# Patient Record
Sex: MALE | Race: WHITE | NOT HISPANIC OR LATINO
[De-identification: names, ages, dates, MRNs, and addresses within clinical notes are randomized per-mention and may not be internally consistent; named-entity substitution may affect disease eponyms.]

---

## 2019-02-18 ENCOUNTER — APPOINTMENT (OUTPATIENT)
Dept: CARDIOLOGY | Facility: CLINIC | Age: 54
End: 2019-02-18
Payer: COMMERCIAL

## 2019-02-18 PROCEDURE — 93306 TTE W/DOPPLER COMPLETE: CPT

## 2019-02-22 ENCOUNTER — APPOINTMENT (OUTPATIENT)
Dept: CARDIOLOGY | Facility: CLINIC | Age: 54
End: 2019-02-22
Payer: COMMERCIAL

## 2019-02-22 PROCEDURE — 93015 CV STRESS TEST SUPVJ I&R: CPT

## 2019-03-22 ENCOUNTER — APPOINTMENT (OUTPATIENT)
Dept: CARDIOLOGY | Facility: CLINIC | Age: 54
End: 2019-03-22
Payer: COMMERCIAL

## 2019-03-22 ENCOUNTER — NON-APPOINTMENT (OUTPATIENT)
Age: 54
End: 2019-03-22

## 2019-03-22 VITALS — DIASTOLIC BLOOD PRESSURE: 80 MMHG | SYSTOLIC BLOOD PRESSURE: 140 MMHG

## 2019-03-22 VITALS
WEIGHT: 207 LBS | BODY MASS INDEX: 29.63 KG/M2 | SYSTOLIC BLOOD PRESSURE: 134 MMHG | OXYGEN SATURATION: 97 % | HEART RATE: 69 BPM | DIASTOLIC BLOOD PRESSURE: 82 MMHG | HEIGHT: 70 IN

## 2019-03-22 DIAGNOSIS — F17.200 NICOTINE DEPENDENCE, UNSPECIFIED, UNCOMPLICATED: ICD-10-CM

## 2019-03-22 DIAGNOSIS — Z80.9 FAMILY HISTORY OF MALIGNANT NEOPLASM, UNSPECIFIED: ICD-10-CM

## 2019-03-22 DIAGNOSIS — Z72.0 TOBACCO USE: ICD-10-CM

## 2019-03-22 PROCEDURE — 99244 OFF/OP CNSLTJ NEW/EST MOD 40: CPT | Mod: 25

## 2019-03-22 PROCEDURE — 93000 ELECTROCARDIOGRAM COMPLETE: CPT

## 2019-03-22 NOTE — DISCUSSION/SUMMARY
[___ Month(s)] : [unfilled] month(s) [With Me] : with me [FreeTextEntry1] : \par Dilated aortic root: Minimal; likely related to hypertension; I anticipate serial surveillance with echocardiography and/or CT imaging.\par \par Hypertension: I suspect that Mr. Bender has mild hypertension and that he would benefit from pharmacotherapy.  However, since blood pressure is only mildly elevated and this is my first visit with him I am reluctant to initiate therapy at this time; we discussed lifestyle modification - I also asked him to followup with his primary care team (Zac Patricio and Dr. Shant Pollock) to reassess blood pressure.  If his blood pressure is persistently elevated I would favor initiation of low-dose ACE inhibitor, such as lisinopril 10 mg daily, since he did not tolerate beta blockade in the past.\par \par Tobacco use: Daily tobacco use; patient doesn't seem particularly motivated to quit at this time - should continue this discussion.

## 2019-03-22 NOTE — REVIEW OF SYSTEMS
[Eyeglasses] : currently wearing eyeglasses [Shortness Of Breath] : no shortness of breath [Dyspnea on exertion] : not dyspnea during exertion [Chest  Pressure] : no chest pressure [Chest Pain] : no chest pain [Lower Ext Edema] : no extremity edema [Palpitations] : no palpitations [Heartburn] : heartburn [Negative] : Heme/Lymph

## 2019-03-22 NOTE — HISTORY OF PRESENT ILLNESS
[FreeTextEntry1] : Crow Bender is a 53-year-old man mildly dilated aortic root (4 cm on CT 2/2016), acid reflux, and tobacco use who presents for cardiology consultation at the request of his primary care physician.   Several years ago he was diagnosed with mild hypertension and prescribed carvedilol -- he didn't think he had hypertension at the time and didn't like the way carvedilol made him feel so we discontinued shortly after initiation.  He exercises regularly and consumes a healthy diet -- 7 days of resistance training at a local gym.  There is no history of heart disease in his family.  He has not been experiencing angina, dyspnea, or palpitations.  He tries to avoid sodium.\par \par

## 2019-03-22 NOTE — PHYSICAL EXAM
[Well Groomed] : well groomed [General Appearance - In No Acute Distress] : no acute distress [Eyelids - No Xanthelasma] : the eyelids demonstrated no xanthelasmas [No Oral Pallor] : no oral pallor [FreeTextEntry1] : no JVD [Heart Rate And Rhythm] : heart rate and rhythm were normal [Heart Sounds] : normal S1 and S2 [Respiration, Rhythm And Depth] : normal respiratory rhythm and effort [Bowel Sounds] : normal bowel sounds [Abdomen Soft] : soft [Abdomen Tenderness] : non-tender [Abnormal Walk] : normal gait [Nail Clubbing] : no clubbing of the fingernails [Cyanosis, Localized] : no localized cyanosis [] : no rash [Oriented To Time, Place, And Person] : oriented to person, place, and time [Impaired Insight] : insight and judgment were intact [Affect] : the affect was normal [Mood] : the mood was normal

## 2019-03-22 NOTE — REASON FOR VISIT
[Medication Management] : Medication management [FreeTextEntry1] : mildly dilated aortic root, elevated blood pressure

## 2020-12-15 DIAGNOSIS — Z87.891 PERSONAL HISTORY OF NICOTINE DEPENDENCE: ICD-10-CM

## 2020-12-15 RX ORDER — DEXLANSOPRAZOLE 30 MG/1
30 CAPSULE, DELAYED RELEASE ORAL DAILY
Refills: 0 | Status: DISCONTINUED | COMMUNITY
End: 2020-12-15

## 2020-12-16 ENCOUNTER — APPOINTMENT (OUTPATIENT)
Dept: CARDIOLOGY | Facility: CLINIC | Age: 55
End: 2020-12-16
Payer: COMMERCIAL

## 2020-12-16 ENCOUNTER — NON-APPOINTMENT (OUTPATIENT)
Age: 55
End: 2020-12-16

## 2020-12-16 VITALS
OXYGEN SATURATION: 98 % | HEIGHT: 70 IN | SYSTOLIC BLOOD PRESSURE: 154 MMHG | HEART RATE: 58 BPM | WEIGHT: 214 LBS | BODY MASS INDEX: 30.64 KG/M2 | DIASTOLIC BLOOD PRESSURE: 87 MMHG

## 2020-12-16 PROCEDURE — 99072 ADDL SUPL MATRL&STAF TM PHE: CPT

## 2020-12-16 PROCEDURE — 93000 ELECTROCARDIOGRAM COMPLETE: CPT

## 2020-12-16 PROCEDURE — 99244 OFF/OP CNSLTJ NEW/EST MOD 40: CPT | Mod: 25

## 2021-01-25 ENCOUNTER — NON-APPOINTMENT (OUTPATIENT)
Age: 56
End: 2021-01-25

## 2021-01-27 ENCOUNTER — APPOINTMENT (OUTPATIENT)
Dept: CARDIOLOGY | Facility: CLINIC | Age: 56
End: 2021-01-27
Payer: COMMERCIAL

## 2021-01-27 VITALS
DIASTOLIC BLOOD PRESSURE: 90 MMHG | SYSTOLIC BLOOD PRESSURE: 170 MMHG | HEART RATE: 57 BPM | HEIGHT: 70 IN | WEIGHT: 218 LBS | BODY MASS INDEX: 31.21 KG/M2 | OXYGEN SATURATION: 97 %

## 2021-01-27 VITALS — SYSTOLIC BLOOD PRESSURE: 144 MMHG | DIASTOLIC BLOOD PRESSURE: 86 MMHG

## 2021-01-27 PROCEDURE — 99214 OFFICE O/P EST MOD 30 MIN: CPT

## 2021-01-27 PROCEDURE — 99072 ADDL SUPL MATRL&STAF TM PHE: CPT

## 2021-01-27 PROCEDURE — 93000 ELECTROCARDIOGRAM COMPLETE: CPT

## 2021-02-09 ENCOUNTER — APPOINTMENT (OUTPATIENT)
Dept: CARDIOLOGY | Facility: CLINIC | Age: 56
End: 2021-02-09
Payer: COMMERCIAL

## 2021-02-09 PROCEDURE — 99072 ADDL SUPL MATRL&STAF TM PHE: CPT

## 2021-02-09 PROCEDURE — 93306 TTE W/DOPPLER COMPLETE: CPT

## 2021-02-11 ENCOUNTER — NON-APPOINTMENT (OUTPATIENT)
Age: 56
End: 2021-02-11

## 2021-09-07 ENCOUNTER — APPOINTMENT (OUTPATIENT)
Dept: SURGERY | Facility: CLINIC | Age: 56
End: 2021-09-07

## 2021-10-01 ENCOUNTER — APPOINTMENT (OUTPATIENT)
Dept: SURGERY | Facility: CLINIC | Age: 56
End: 2021-10-01
Payer: COMMERCIAL

## 2021-10-01 DIAGNOSIS — K42.9 UMBILICAL HERNIA W/OUT OBSTRUCTION OR GANGRENE: ICD-10-CM

## 2021-10-01 PROCEDURE — 99203 OFFICE O/P NEW LOW 30 MIN: CPT

## 2021-10-01 NOTE — HISTORY OF PRESENT ILLNESS
[de-identified] : Referred by his brother (forme patient of [facility]) for evaluation of small bulge in umbilicus, hernia.

## 2021-10-01 NOTE — ASSESSMENT
[FreeTextEntry1] : 56-year-old otherwise healthy gentleman presenting for evaluation of a small umbilical hernia.  The patient does not describe any adverse symptoms.  He does note a small nodule at the superior margin of his umbilicus.  This patient exercises regularly and has a relatively physical job working for [employer].  As he is entering the busy season prior [facility] he is hoping repair pair could be deferred until after the [place] season.\par \par Examination reveals a very small subcentimeter umbilical hernia with what appears to be a small knuckle of properitoneal incarcerated fat.  There is no erythema, induration or fluctuance.  This is not tender to palpation.  And once again this patient has no obstructive symptoms.\par \par Surgical repair has been recommended as this can enlarge over time and potentially become strangulated.  Given the small nature of this defect an open repair should be suitable with minimal scarring.\par \par The patient will return to schedule electively at his convenience.\par \par Signs and symptoms of incarceration/strangulation/obstruction have been reviewed with the patient.  Should such symptoms present, urgent medical attention should be sought.  Contact my office immediately and or head to your nearest emergency room for evaluation and treatment.  This may require immediate surgical repair.

## 2021-10-01 NOTE — PHYSICAL EXAM
[Normal Breath Sounds] : Normal breath sounds [Normal Heart Sounds] : normal heart sounds [Normal Rate and Rhythm] : normal rate and rhythm [No Rash or Lesion] : No rash or lesion [Alert] : alert [Oriented to Person] : oriented to person [Oriented to Place] : oriented to place [Oriented to Time] : oriented to time [Calm] : calm [de-identified] : Very fit and muscular gentleman in no distress [de-identified] : MIRZA SIERRA EOMI [de-identified] : Soft, nontender nondistended, positive bowel sounds in all four quads.   No rebound or guarding.  Very small fat containing umbilical defect.\par  [de-identified] : Ambulating without difficulty or  assistance

## 2022-01-02 ENCOUNTER — TRANSCRIPTION ENCOUNTER (OUTPATIENT)
Age: 57
End: 2022-01-02

## 2022-01-03 ENCOUNTER — EMERGENCY (EMERGENCY)
Facility: HOSPITAL | Age: 57
LOS: 1 days | Discharge: HOME | End: 2022-01-03
Attending: EMERGENCY MEDICINE | Admitting: EMERGENCY MEDICINE
Payer: COMMERCIAL

## 2022-01-03 VITALS
OXYGEN SATURATION: 99 % | TEMPERATURE: 98 F | RESPIRATION RATE: 16 BRPM | DIASTOLIC BLOOD PRESSURE: 72 MMHG | HEART RATE: 81 BPM | SYSTOLIC BLOOD PRESSURE: 163 MMHG

## 2022-01-03 VITALS
HEART RATE: 61 BPM | TEMPERATURE: 98 F | SYSTOLIC BLOOD PRESSURE: 210 MMHG | HEIGHT: 70 IN | WEIGHT: 214.95 LBS | OXYGEN SATURATION: 98 % | RESPIRATION RATE: 18 BRPM | DIASTOLIC BLOOD PRESSURE: 105 MMHG

## 2022-01-03 PROCEDURE — 96372 THER/PROPH/DIAG INJ SC/IM: CPT

## 2022-01-03 PROCEDURE — 99283 EMERGENCY DEPT VISIT LOW MDM: CPT | Mod: 25

## 2022-01-03 PROCEDURE — 87205 SMEAR GRAM STAIN: CPT

## 2022-01-03 PROCEDURE — 90471 IMMUNIZATION ADMIN: CPT

## 2022-01-03 PROCEDURE — 90715 TDAP VACCINE 7 YRS/> IM: CPT

## 2022-01-03 PROCEDURE — 87077 CULTURE AEROBIC IDENTIFY: CPT

## 2022-01-03 PROCEDURE — 10060 I&D ABSCESS SIMPLE/SINGLE: CPT

## 2022-01-03 PROCEDURE — 99284 EMERGENCY DEPT VISIT MOD MDM: CPT

## 2022-01-03 PROCEDURE — 87075 CULTR BACTERIA EXCEPT BLOOD: CPT

## 2022-01-03 PROCEDURE — 87070 CULTURE OTHR SPECIMN AEROBIC: CPT

## 2022-01-03 RX ORDER — TETANUS TOXOID, REDUCED DIPHTHERIA TOXOID AND ACELLULAR PERTUSSIS VACCINE, ADSORBED 5; 2.5; 8; 8; 2.5 [IU]/.5ML; [IU]/.5ML; UG/.5ML; UG/.5ML; UG/.5ML
0.5 SUSPENSION INTRAMUSCULAR ONCE
Refills: 0 | Status: COMPLETED | OUTPATIENT
Start: 2022-01-03 | End: 2022-01-03

## 2022-01-03 RX ADMIN — TETANUS TOXOID, REDUCED DIPHTHERIA TOXOID AND ACELLULAR PERTUSSIS VACCINE, ADSORBED 0.5 MILLILITER(S): 5; 2.5; 8; 8; 2.5 SUSPENSION INTRAMUSCULAR at 21:54

## 2022-01-03 NOTE — ED PROVIDER NOTE - ATTENDING CONTRIBUTION TO CARE
Pt is a 57 yo male who presents to the ED with a cc of finger pain s/p dog bite. Pt reports that approx 3 days ago he was playing with his dog when his dog bit his distal right thumb crushing it between a day. He has some mild initial discomfort but did not seek medical attention at the time. He reports that the pain has been progressively worsening and now he reports a throbbing pulsating sensation to the distal aspect of his finger. Pt reports that the pain radiates up into his hand. Denies fever, chills, N/V, CP, SOB, abd pain. Denies ext numbness or weakness. Pt is right hand dominant. Pt dog's vaccinations are UTD. Pt is unsure of the date of his last Tetanus. 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 He then followed up with an additional outside physician who ordered Augmentin and Diflonenc. Pt took one dose of the Augmentin and Doxycyline with no improvement in pain and so he came to the ED for further work up. On exam pt sitting up in bed NAD, heart RR, lungs CTA, abd soft NT/ND. Right hand: Ecchymosis noted to distal nailbed with what appears to be surrounding paronychia. TTP to distal tip of finger. Pt with FROM of digit. No surrounding cellulitis. Cap refill less then 2 seconds +radial pulse. Pt presenting with s/p concerning for paronychia. NVI. Will update Tetanus, x-ray reviewed no acute pathology. Hand surgery at bedside for incision and drainage. Pt advised to stop the Amoxicillin and Doxycyline and to continue the Augmentin and NSAID  57 y/o male without reported PMHx presents today due to right finger dog bite 3 days ago. pt reports his own dog bite him. pt describes pain as throbbing, non-radiating, and currently 10/10. pt reports he was prescribed Augmentin and Doxycyline. pt denies fever, numbness/weakness, concern for rabies, limited ROM, or any other complaints.

## 2022-01-03 NOTE — ED ADULT NURSE NOTE - OBJECTIVE STATEMENT
56 year old male presents to the ED complaining of finger pain. Patient admits he was playing with his dog two nights ago when the dog bit down onto his thumb nail, right hand. Patient did not sustained a laceration because there was a dog toy in between the teeth of the dog and his skin. Bruising noted to the right thumb nail. Patient complains of worsening pain over the last few days. Patient had his work MD look at the hand and was told to come to the ED for further evaluation. Patient admits his dog is vaccinated. 56 year old male presents to the ED complaining of finger pain. Patient admits he was playing with his dog four nights ago when the dog bit down onto his thumb nail, right hand. Patient did not sustained a laceration because there was a dog toy in between the teeth of the dog and his skin. Bruising noted to the right thumb nail. Patient complains of worsening pain over the last few days. Patient had his work MD look at the hand and was told to come to the ED for further evaluation. Patient admits his dog is vaccinated.

## 2022-01-03 NOTE — ED PROVIDER NOTE - PATIENT PORTAL LINK FT
You can access the FollowMyHealth Patient Portal offered by [facility] by registering at the following website: [email or web address]. By joining [employer]’s FollowMyHealth portal, you will also be able to view your health information using other applications (apps) compatible with our system.

## 2022-01-03 NOTE — ED ADULT TRIAGE NOTE - CHIEF COMPLAINT QUOTE
Pt was bit to Rt thumb 3 days ago by his own dog, noted with small cut under nail, ts pain has become progressively worse, thinks its infected.

## 2022-01-03 NOTE — ED PROVIDER NOTE - OBJECTIVE STATEMENT
57 y/o male without reported PMHx presents today due to right finger dog bite 3 days ago. pt reports his own dog bite him. pt describes pain as throbbing, non-radiating, and currently 10/10. pt reports he was prescribed Augmentin and Doxycyline. pt denies fever, numbness/weakness, concern for rabies, limited ROM, or any other complaints.

## 2022-01-03 NOTE — ED PROVIDER NOTE - NSFOLLOWUPINSTRUCTIONS_ED_ALL_ED_FT
Continue Augmentin. return for increased redness/swelling/discharge/fever.         Paronychia    WHAT YOU NEED TO KNOW:    What is paronychia? Paronychia is an infection of your nail fold caused by bacteria or a fungus. The nail fold is the skin around your nail. Paronychia may happen suddenly and last for 6 weeks or longer. You may have paronychia on more than 1 finger or toe.    What increases my risk for paronychia?   •Trauma: Any injury that causes your skin to tear can lead to infection. Your risk is increased if you have ingrown nails, bite your nails, or wear acrylic nails.      •Frequent contact with water: Jobs that require you to soak your hands in water often may increase your risk for paronychia. Common examples are nurses, cooks, and . Swimmers also have increased risk.      •Medical conditions: Diabetes and other conditions that cause a weak immune system can increase your risk. Some examples are skin cancer, psoriasis, HIV, and lupus.      •Chemicals: Contact with soaps, detergents, and other chemicals can cause inflammation and lead to paronychia.      •Allergies: Allergies to certain foods, nail polish, or latex can cause inflammation and increase your risk.      What are the signs and symptoms of paronychia?   •Red, hot, swollen, painful nail fold      •Pus coming out of your nail fold when you press on it      •Nail that pulls away from your nail fold and may fall off      •Changes in nail color, such as green nails      •Fever      •Thick, rough nail, or ridges in the nail      How is paronychia diagnosed? Your healthcare provider will examine your nails and ask about your symptoms. He may press on your infected nail to see if pus drains from it. He will send any pus to a lab for tests to learn what germ is causing your infection. This is called a fluid culture.     How is paronychia treated?   •Medicine:?Td vaccine is a booster shot used to help prevent tetanus and diphtheria. The Td booster may be given to adolescents and adults every 10 years or for certain wounds and injuries.      ?Antibiotics: This medicine will help fight or prevent an infection caused by bacteria. It may be given as a pill, cream, or ointment.      ?Steroids: This medicine will help decrease inflammation. It may be given as a pill, cream, or ointment.      ?Antifungal medicine: This medicine helps kill fungus that may be causing your infection. It may be given as a cream or ointment.      ?NSAIDs: These medicines decrease pain and swelling. NSAIDs are available without a doctor's order. Ask your healthcare provider which medicine is right for you. Ask how much to take and when to take it. Take as directed. NSAIDs can cause stomach bleeding and kidney problems if not taken correctly.      •Procedures: You may need surgery to drain an abscess (pus pocket) in your finger or toe. Your nail may need to be removed. Infected tissue around your nail may also need to be removed.      What are the risks of paronychia? Your nail may become loose, deformed, or fall off. The infection may form a large abscess on your nail. The infection may spread to nearby tissue and bone.     How can paronychia be prevented?   •Avoid chemicals and allergens that may harm your skin and nails. This includes soaps, laundry detergents, and nail products.      •Keep your nails clean and dry. Do not soak your nails in water. Use cotton-lined rubber gloves or wear 2 rubber gloves if you work with food or water. The gloves will help protect your nail folds.      •Keep your nails short. Do not bite your nails, pick at your hangnails, suck your fingers, or wear fake nails. Bring your own nail tools when you go to the nail salon.      How can I manage my symptoms?   •Soak your nail: Soak your nail in a mixture of equal parts vinegar and water 3 or 4 times each day. This will help decrease inflammation.      •Apply a warm compress: Soak a washcloth in warm water and place it on your nail. This will help decrease inflammation.       •Elevate: Raise your nail above the level of your heart as often as you can. This will help decrease swelling and pain. Prop your nail on pillows or blankets to keep it elevated comfortably.       •Use lotion: Apply lotion after you wash your hands. This will prevent the skin from becoming too dry.       When should I contact my healthcare provider?   •Your nail becomes loose, deformed, or falls off.      •You have a large abscess on your nail.      •You have questions or concerns about your condition or care.      When should I seek immediate care?   •You have severe nail pain.      •The inflammation spreads to your hand or arm.      CARE AGREEMENT:    You have the right to help plan your care. Learn about your health condition and how it may be treated. Discuss treatment options with your healthcare providers to decide what care you want to receive. You always have the right to refuse treatment.        © Copyright [employer] 2022

## 2022-01-03 NOTE — ED PROVIDER NOTE - CARE PROVIDER_API CALL
Dixon Juarez)  Plastic Surgery  [address] [place] [address], [address] [place] [id number]  Phone: [phone]  Fax: [phone]  Follow Up Time: 7-10 Days

## 2022-01-03 NOTE — ED PROVIDER NOTE - PHYSICAL EXAMINATION
Constitutional: Awake, Alert, non-toxic. NAD. Well appearing, well nourished.   HEAD: Normocephalic, atraumatic.   EYES: EOM intact, conjunctiva and sclera are clear bilaterally.   ENT: No rhinorrhea, patent, mucous membranes pink/moist, no drooling or stridor.   NECK: Supple, non-tender  RESPIRATORY: Normal respiratory effort  EXTREMITIES: Full passive and active ROM in all extremities; (+) distal right thumb small bruising ad TTP, no streaking erythema, FROM with resistance, wrist/MCP/PIP/DIP non-tender to palpation; distal pulses palpable and symmetric  SKIN: Warm, dry; good skin turgor, no apparent lesions or rashes, no ecchymosis, brisk capillary refill.  NEURO: A&O x3. Sensory and motor functions are grossly intact. Speech is normal. Appearance and judgement seem appropriate for gender and age.

## 2022-01-03 NOTE — ED PROVIDER NOTE - CLINICAL SUMMARY MEDICAL DECISION MAKING FREE TEXT BOX
presents today due to right finger dog bite 3 days ago. pt reports his own dog bite him. hand consulted and elevated nail. plan includes dc with continuation of Augmentin.

## 2022-03-14 ENCOUNTER — RX RENEWAL (OUTPATIENT)
Age: 57
End: 2022-03-14

## 2022-03-16 ENCOUNTER — NON-APPOINTMENT (OUTPATIENT)
Age: 57
End: 2022-03-16

## 2022-03-16 PROBLEM — Z78.9 OTHER SPECIFIED HEALTH STATUS: Chronic | Status: ACTIVE | Noted: 2022-01-03

## 2022-03-28 ENCOUNTER — APPOINTMENT (OUTPATIENT)
Dept: CARDIOLOGY | Facility: CLINIC | Age: 57
End: 2022-03-28
Payer: COMMERCIAL

## 2022-03-28 ENCOUNTER — NON-APPOINTMENT (OUTPATIENT)
Age: 57
End: 2022-03-28

## 2022-03-28 VITALS
OXYGEN SATURATION: 97 % | HEART RATE: 65 BPM | DIASTOLIC BLOOD PRESSURE: 83 MMHG | SYSTOLIC BLOOD PRESSURE: 153 MMHG | BODY MASS INDEX: 30.78 KG/M2 | HEIGHT: 70 IN | WEIGHT: 215 LBS

## 2022-03-28 VITALS — DIASTOLIC BLOOD PRESSURE: 78 MMHG | SYSTOLIC BLOOD PRESSURE: 128 MMHG

## 2022-03-28 DIAGNOSIS — I77.810 THORACIC AORTIC ECTASIA: ICD-10-CM

## 2022-03-28 PROCEDURE — 93000 ELECTROCARDIOGRAM COMPLETE: CPT

## 2022-03-28 PROCEDURE — 99214 OFFICE O/P EST MOD 30 MIN: CPT

## 2022-03-28 RX ORDER — FINASTERIDE 1 MG/1
1 TABLET ORAL
Refills: 0 | Status: ACTIVE | COMMUNITY
Start: 2022-03-28

## 2022-04-04 ENCOUNTER — APPOINTMENT (OUTPATIENT)
Dept: CARDIOLOGY | Facility: CLINIC | Age: 57
End: 2022-04-04
Payer: COMMERCIAL

## 2022-04-04 PROCEDURE — 93306 TTE W/DOPPLER COMPLETE: CPT

## 2022-06-08 ENCOUNTER — FORM ENCOUNTER (OUTPATIENT)
Age: 57
End: 2022-06-08

## 2022-06-09 ENCOUNTER — TRANSCRIPTION ENCOUNTER (OUTPATIENT)
Age: 57
End: 2022-06-09

## 2022-06-10 ENCOUNTER — OUTPATIENT (OUTPATIENT)
Dept: OUTPATIENT SERVICES | Facility: HOSPITAL | Age: 57
LOS: 1 days | End: 2022-06-10

## 2022-06-10 ENCOUNTER — APPOINTMENT (OUTPATIENT)
Dept: DISASTER EMERGENCY | Facility: HOSPITAL | Age: 57
End: 2022-06-10

## 2022-06-10 VITALS
HEART RATE: 66 BPM | DIASTOLIC BLOOD PRESSURE: 77 MMHG | RESPIRATION RATE: 18 BRPM | TEMPERATURE: 98 F | OXYGEN SATURATION: 97 % | SYSTOLIC BLOOD PRESSURE: 120 MMHG

## 2022-06-10 VITALS
RESPIRATION RATE: 18 BRPM | HEART RATE: 65 BPM | DIASTOLIC BLOOD PRESSURE: 86 MMHG | TEMPERATURE: 98 F | SYSTOLIC BLOOD PRESSURE: 147 MMHG | OXYGEN SATURATION: 97 % | WEIGHT: 214.95 LBS | HEIGHT: 70 IN

## 2022-06-10 DIAGNOSIS — U07.1 COVID-19: ICD-10-CM

## 2022-06-10 RX ORDER — BEBTELOVIMAB 87.5 MG/ML
175 INJECTION, SOLUTION INTRAVENOUS ONCE
Refills: 0 | Status: COMPLETED | OUTPATIENT
Start: 2022-06-10 | End: 2022-06-10

## 2022-06-10 RX ADMIN — BEBTELOVIMAB 175 MILLIGRAM(S): 87.5 INJECTION, SOLUTION INTRAVENOUS at 14:40

## 2022-06-10 NOTE — CHART NOTE - NSCHARTNOTEFT_GEN_A_CORE
CC: Monoclonal Antibody Infusion/COVID 19 Positive  56y Male with PMH of HTN, GERD  and recent dx of COVID 19+ who presents today for elective Bebtelovimab. Patient has been screened and was deemed to be a candidate.    Symptoms/ Criteria  Date of Symptom Onset: 6/6/2022  Symptoms: Cough, headache, fatigue  Date of Positive COVID PCR: 6/8/2022  Risk Profile includes:  Cardiovascular disease     Vaccination Status: Received 2 doses of Pfizer vaccine and one booster dose    PMHx:  Infection due to severe acute respiratory syndrome coronavirus 2 (SARS-CoV-2)    No significant past surgical history        Exam/findings:  T(C): 36.7 (06-10-22 @ 15:40), Max: 36.9 (06-10-22 @ 14:55)  HR: 66 (06-10-22 @ 15:40) (60 - 66)  BP: 120/77 (06-10-22 @ 15:40) (120/77 - 147/86)  RR: 18 (06-10-22 @ 15:40) (18 - 18)  SpO2: 97% (06-10-22 @ 15:40) (96% - 97%)    PE:   Appearance: NAD	  HEENT:  NC/AT  Cardiovascular:  No edema  Respiratory: no use of accessory muscles  Gastrointestinal:  non-distended   Skin: warm and dry  Neurologic: Non-focal  Extremities: Normal range of motion    ASSESSMENT:  Pt is COVID positive with mild to moderate symptoms who was referred for elective MAB (Bebtelovimab).    PLAN:  - MAB treatment explained to patient. I have reviewed the Bebtelovimab Emergency Use Authorization (EUA) and I have provided the patient or patient's caregiver with the following information:   1. FDA has authorized emergency use of Bebtelovimab to be administered for the treatment of mild to moderate COVID-19, which is not an FDA-approved biological product.   2. The patient or patient's caregiver has the option to accept or refuse administration of MAB.   3. The significant known and potential risks and benefits of Bebtelovimab and the extent to which such risks and benefits are unknown.  4. Information on available alternative treatments and risks and benefits of those alternatives.  - Patient verbalized understanding of plan and agrees to treatment. All questions answered.  - Consent for MAB obtained.   - 175mg of Bebtelovimab administered as a single intravenous injection over at least 30 seconds.   - Observe patient for one hour post medication administration and then if stable, discharge home with outpatient follow up as planned by PCP.      POST ASSESSMENT:   Patient completed MAB, and monitored x 1 hour post-infusion with no adverse reactions noted, remained hemodynamically stable.  - Patient tolerated infusion well; denies complaints of chest pain/SOB/dizziness/palpitations.   - VSS for discharge home.  - D/C instructions given/ fact sheet included.  - Patient was instructed to self-isolate and use infection control measures (e.g wear mask, isolate, social distance, avoid sharing personal items, clean and disinfect "high touch" surfaces, and frequent handwashing according to the CDC guidelines.   - The patient was informed on what symptoms to be aware of for the next couple of days, and if there are any issues to call the 24/7 clinical call center. Patient was instructed to follow up with primary care provider as needed.    DISCHARGE stable

## 2022-06-11 ENCOUNTER — TRANSCRIPTION ENCOUNTER (OUTPATIENT)
Age: 57
End: 2022-06-11

## 2022-10-07 ENCOUNTER — NON-APPOINTMENT (OUTPATIENT)
Age: 57
End: 2022-10-07

## 2022-11-28 ENCOUNTER — APPOINTMENT (OUTPATIENT)
Dept: CARDIOLOGY | Facility: CLINIC | Age: 57
End: 2022-11-28

## 2022-11-28 ENCOUNTER — NON-APPOINTMENT (OUTPATIENT)
Age: 57
End: 2022-11-28

## 2022-11-28 VITALS
DIASTOLIC BLOOD PRESSURE: 80 MMHG | SYSTOLIC BLOOD PRESSURE: 143 MMHG | HEART RATE: 58 BPM | BODY MASS INDEX: 30.64 KG/M2 | OXYGEN SATURATION: 97 % | WEIGHT: 214 LBS | HEIGHT: 70 IN

## 2022-11-28 VITALS — DIASTOLIC BLOOD PRESSURE: 70 MMHG | SYSTOLIC BLOOD PRESSURE: 122 MMHG

## 2022-11-28 PROCEDURE — 93000 ELECTROCARDIOGRAM COMPLETE: CPT

## 2022-11-28 PROCEDURE — 99214 OFFICE O/P EST MOD 30 MIN: CPT | Mod: 25

## 2023-03-01 ENCOUNTER — APPOINTMENT (OUTPATIENT)
Dept: ORTHOPEDIC SURGERY | Facility: CLINIC | Age: 58
End: 2023-03-01
Payer: OTHER MISCELLANEOUS

## 2023-03-01 VITALS — WEIGHT: 220 LBS | HEIGHT: 70 IN | BODY MASS INDEX: 31.5 KG/M2

## 2023-03-01 DIAGNOSIS — I10 ESSENTIAL (PRIMARY) HYPERTENSION: ICD-10-CM

## 2023-03-01 PROCEDURE — 73630 X-RAY EXAM OF FOOT: CPT | Mod: RT

## 2023-03-01 PROCEDURE — L4350: CPT | Mod: RT

## 2023-03-01 PROCEDURE — 99072 ADDL SUPL MATRL&STAF TM PHE: CPT

## 2023-03-01 PROCEDURE — 73600 X-RAY EXAM OF ANKLE: CPT | Mod: RT

## 2023-03-01 PROCEDURE — 99203 OFFICE O/P NEW LOW 30 MIN: CPT | Mod: 25

## 2023-03-01 NOTE — PHYSICAL EXAM
[Right] : right foot and ankle [] : swelling [Moderate] : moderate swelling of lateral ankle [Mild] : mild swelling of lateral foot

## 2023-03-01 NOTE — HISTORY OF PRESENT ILLNESS
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 Pt states no prior TX  [de-identified] : None [de-identified] : Taye hand Local 1

## 2023-03-01 NOTE — IMAGING
[de-identified] : Right ankle\par mild antalgic gait\par tender at atf\par incr pain on inveersion\par NV intact \par mild tendeness along 5th metatatrsal\par

## 2023-03-13 ENCOUNTER — APPOINTMENT (OUTPATIENT)
Dept: ORTHOPEDIC SURGERY | Facility: CLINIC | Age: 58
End: 2023-03-13
Payer: OTHER MISCELLANEOUS

## 2023-03-13 VITALS — WEIGHT: 220 LBS | HEIGHT: 70 IN | BODY MASS INDEX: 31.5 KG/M2

## 2023-03-13 PROCEDURE — 99072 ADDL SUPL MATRL&STAF TM PHE: CPT

## 2023-03-13 PROCEDURE — 99213 OFFICE O/P EST LOW 20 MIN: CPT

## 2023-03-13 NOTE — PHYSICAL EXAM
[de-identified] : Patient returns to the office still with some pain in the right ankle region.  He does not feel a lot of improvement since her prior visit.  He is ambulating with a mild antalgic gait.  Falling is minimal.  Is locally tender over the ATF ligament.  Is also tender over the deltoid ligament.  Does not have any gross instability.  Ocular status is grossly intact.

## 2023-03-13 NOTE — HISTORY OF PRESENT ILLNESS
[5] : 5 [3] : 3 [Dull/Aching] : dull/aching [Localized] : localized [Full time] : Work status: full time [] : Post Surgical Visit: no [FreeTextEntry1] : Rt. ankle [FreeTextEntry3] : 2/23/23 [FreeTextEntry5] : 58 y/o M presents for WC f/u eval. of Rt. ankle. Pt reports he is doing well, pain levels remain the same since last visit. [de-identified] : 03/01/2023 [de-identified] : Dr. Vaughan [de-identified] : aircast brace [de-identified] :  Local 1

## 2023-03-13 NOTE — ASSESSMENT
[FreeTextEntry1] : Due to the patient's continued symptoms and antalgic gait he will be referred for an MRI to evaluate the ligaments in his ankle.  He will continue use the Aircast or or a high hightop shoe.  He will follow-up after the MRI is performed.

## 2023-03-19 ENCOUNTER — FORM ENCOUNTER (OUTPATIENT)
Age: 58
End: 2023-03-19

## 2023-03-20 ENCOUNTER — APPOINTMENT (OUTPATIENT)
Dept: MRI IMAGING | Facility: CLINIC | Age: 58
End: 2023-03-20
Payer: OTHER MISCELLANEOUS

## 2023-03-20 PROCEDURE — 73721 MRI JNT OF LWR EXTRE W/O DYE: CPT | Mod: RT

## 2023-04-11 ENCOUNTER — APPOINTMENT (OUTPATIENT)
Dept: ORTHOPEDIC SURGERY | Facility: CLINIC | Age: 58
End: 2023-04-11

## 2023-04-21 ENCOUNTER — APPOINTMENT (OUTPATIENT)
Dept: ORTHOPEDIC SURGERY | Facility: CLINIC | Age: 58
End: 2023-04-21
Payer: OTHER MISCELLANEOUS

## 2023-04-21 VITALS — WEIGHT: 220 LBS | BODY MASS INDEX: 31.5 KG/M2 | HEIGHT: 70 IN

## 2023-04-21 PROCEDURE — 99213 OFFICE O/P EST LOW 20 MIN: CPT

## 2023-04-21 PROCEDURE — 95864 NEEDLE EMG 4 EXTREMITIES: CPT | Mod: 1L

## 2023-04-21 NOTE — PHYSICAL EXAM
[de-identified] : Patient returns for follow-up examination of the right ankle.  He still has some pain in the ankle on the lateral side.  He also describes some numbness in his right foot.  His MRI was performed since his prior previous visit and did show significant lateral ligamentous degenerative changes.  He also has some degenerative changes and scarring in the tarsal tunnel region.  He reports some vague decreased sensation on the medial aspect of aspect of his foot or the great toe.

## 2023-04-21 NOTE — ASSESSMENT
[FreeTextEntry1] : Patient will attend some physical therapy to try and decrease his pain and his symptoms.  Due to [place] in the city his heart has been hard for him to do that so far but he is going to attempted at this point time.  He will see the neurologist for nerve test to evaluate potential tarsal tunnel syndrome in his foot.  He will return after that study is performed.

## 2023-04-21 NOTE — HISTORY OF PRESENT ILLNESS
[5] : 5 [3] : 3 [Dull/Aching] : dull/aching [Localized] : localized [Full time] : Work status: full time [] : Post Surgical Visit: no [FreeTextEntry1] : Rt. ankle [FreeTextEntry3] : 2/23/23 [FreeTextEntry5] : 58 y/o M presents for MRI results for his Rt. ankle today. Pt reports of no significant changes since last visit.  [de-identified] : 3/13/2023 [de-identified] : Dr. Vaughan [de-identified] :  Local 1

## 2023-04-24 ENCOUNTER — OFFICE (OUTPATIENT)
Dept: URBAN - METROPOLITAN AREA CLINIC 109 | Facility: CLINIC | Age: 58
Setting detail: OPHTHALMOLOGY
End: 2023-04-24
Payer: COMMERCIAL

## 2023-04-24 DIAGNOSIS — H11.153: ICD-10-CM

## 2023-04-24 DIAGNOSIS — H43.391: ICD-10-CM

## 2023-04-24 DIAGNOSIS — D31.32: ICD-10-CM

## 2023-04-24 DIAGNOSIS — D31.31: ICD-10-CM

## 2023-04-24 DIAGNOSIS — H16.223: ICD-10-CM

## 2023-04-24 PROCEDURE — 92201 OPSCPY EXTND RTA DRAW UNI/BI: CPT | Performed by: OPHTHALMOLOGY

## 2023-04-24 PROCEDURE — 92250 FUNDUS PHOTOGRAPHY W/I&R: CPT | Performed by: OPHTHALMOLOGY

## 2023-04-24 PROCEDURE — 92004 COMPRE OPH EXAM NEW PT 1/>: CPT | Performed by: OPHTHALMOLOGY

## 2023-04-24 ASSESSMENT — SPHEQUIV_DERIVED
OS_SPHEQUIV: -0.875
OD_SPHEQUIV: -1.875

## 2023-04-24 ASSESSMENT — REFRACTION_AUTOREFRACTION
OD_SPHERE: -1.50
OD_CYLINDER: -0.75
OS_AXIS: 90
OS_SPHERE: 0.00
OS_CYLINDER: -1.75
OD_AXIS: 152

## 2023-04-24 ASSESSMENT — CONFRONTATIONAL VISUAL FIELD TEST (CVF)
OD_FINDINGS: FULL
OS_FINDINGS: FULL

## 2023-04-24 ASSESSMENT — TONOMETRY
OS_IOP_MMHG: 15
OD_IOP_MMHG: 15

## 2023-04-24 ASSESSMENT — SUPERFICIAL PUNCTATE KERATITIS (SPK)
OS_SPK: 1+
OD_SPK: 1+

## 2023-04-24 ASSESSMENT — VISUAL ACUITY
OS_BCVA: 20/60+1
OD_BCVA: 20/20

## 2023-11-27 ENCOUNTER — RX RENEWAL (OUTPATIENT)
Age: 58
End: 2023-11-27

## 2024-01-11 ENCOUNTER — APPOINTMENT (OUTPATIENT)
Dept: CARDIOLOGY | Facility: CLINIC | Age: 59
End: 2024-01-11
Payer: COMMERCIAL

## 2024-01-11 ENCOUNTER — NON-APPOINTMENT (OUTPATIENT)
Age: 59
End: 2024-01-11

## 2024-01-11 VITALS — SYSTOLIC BLOOD PRESSURE: 128 MMHG | DIASTOLIC BLOOD PRESSURE: 70 MMHG

## 2024-01-11 VITALS
WEIGHT: 209 LBS | BODY MASS INDEX: 29.92 KG/M2 | OXYGEN SATURATION: 97 % | SYSTOLIC BLOOD PRESSURE: 142 MMHG | HEART RATE: 61 BPM | HEIGHT: 70 IN | DIASTOLIC BLOOD PRESSURE: 76 MMHG

## 2024-01-11 DIAGNOSIS — I10 ESSENTIAL (PRIMARY) HYPERTENSION: ICD-10-CM

## 2024-01-11 DIAGNOSIS — Z00.00 ENCOUNTER FOR GENERAL ADULT MEDICAL EXAMINATION W/OUT ABNORMAL FINDINGS: ICD-10-CM

## 2024-01-11 DIAGNOSIS — I77.810 THORACIC AORTIC ECTASIA: ICD-10-CM

## 2024-01-11 PROCEDURE — 99215 OFFICE O/P EST HI 40 MIN: CPT | Mod: 25

## 2024-01-11 PROCEDURE — G2211 COMPLEX E/M VISIT ADD ON: CPT

## 2024-01-11 PROCEDURE — 93000 ELECTROCARDIOGRAM COMPLETE: CPT

## 2024-01-11 NOTE — REVIEW OF SYSTEMS
[Negative] : Heme/Lymph [FreeTextEntry5] : See HPI <-- Click to add NO significant Past Surgical History

## 2024-01-11 NOTE — HISTORY OF PRESENT ILLNESS
[FreeTextEntry1] : This is a 58 year old man with a dilated aortic root and hypertension who presents to the office for a followup cardiac evaluation.  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  He never has any symptoms with exertion, denying chest pains, dyspnea, PND, orthopnea, LE swelling, dizziness, or syncope.  He does not follow a specific diet.  He does exercise.  He was started on olmesartan, and his blood pressure has been controlled.

## 2024-01-11 NOTE — PHYSICAL EXAM
[Normal Appearance] : normal appearance [Well Groomed] : well groomed [General Appearance - In No Acute Distress] : no acute distress [Normal Conjunctiva] : the conjunctiva exhibited no abnormalities [Normal Oral Mucosa] : normal oral mucosa [Normal Jugular Venous V Waves Present] : normal jugular venous V waves present [FreeTextEntry1] : No JVD [Respiration, Rhythm And Depth] : normal respiratory rhythm and effort [Exaggerated Use Of Accessory Muscles For Inspiration] : no accessory muscle use [Auscultation Breath Sounds / Voice Sounds] : lungs were clear to auscultation bilaterally [Bowel Sounds] : normal bowel sounds [Abdomen Soft] : soft [Abnormal Walk] : normal gait [Gait - Sufficient For Exercise Testing] : the gait was sufficient for exercise testing [Nail Clubbing] : no clubbing of the fingernails [Cyanosis, Localized] : no localized cyanosis [Skin Color & Pigmentation] : normal skin color and pigmentation [] : no rash [Oriented To Time, Place, And Person] : oriented to person, place, and time [No Anxiety] : not feeling anxious [Normal] : normal [No Precordial Heave] : no precordial heave was noted [Normal Rate] : normal [Rhythm Regular] : regular [Normal S1] : normal S1 [Normal S2] : normal S2 [No Murmur] : no murmurs heard [2+] : left 2+ [No Abnormalities] : the abdominal aorta was not enlarged and no bruit was heard [No Pitting Edema] : no pitting edema present

## 2024-01-11 NOTE — DISCUSSION/SUMMARY
[FreeTextEntry1] : This is a 58 year old man with a dilated aortic root and hypertension who presents to the office for a followup cardiac evaluation.  His BP is controlled on olmesartan, and he will continue it.   He is going to have a repeat ischemia evaluation with a CT coronaries, which will also assess his aortic root.  He needs a full set of repeat blood work.    He  will follow in six months.   We discussed the benefits of a healthy diet, regular exercise and physical activity, and weight loss in detail.  [EKG obtained to assist in diagnosis and management of assessed problem(s)] : EKG obtained to assist in diagnosis and management of assessed problem(s)

## 2024-01-31 ENCOUNTER — APPOINTMENT (OUTPATIENT)
Dept: ORTHOPEDIC SURGERY | Facility: CLINIC | Age: 59
End: 2024-01-31
Payer: OTHER MISCELLANEOUS

## 2024-01-31 DIAGNOSIS — G57.80 OTHER SPECIFIED MONONEUROPATHIES OF UNSPECIFIED LOWER LIMB: ICD-10-CM

## 2024-01-31 PROCEDURE — 99213 OFFICE O/P EST LOW 20 MIN: CPT | Mod: 25

## 2024-01-31 PROCEDURE — 20600 DRAIN/INJ JOINT/BURSA W/O US: CPT | Mod: RT

## 2024-01-31 NOTE — PROCEDURE
[FreeTextEntry3] : Patient is given a local injection of 1 cc of lidocaine and 1 cc of dexamethasone mixed together into the area of the neuroma between the first and second metatarsal heads this was cleaned off with alcohol and the patient tolerated the procedure well

## 2024-01-31 NOTE — HISTORY OF PRESENT ILLNESS
[5] : 5 [3] : 3 [Dull/Aching] : dull/aching [Localized] : localized [Full time] : Work status: full time [] : Post Surgical Visit: no [FreeTextEntry1] : Rt. ankle [FreeTextEntry3] : 2/23/23 [FreeTextEntry5] : 56 y/o M presents for MRI results for his Rt. ankle today. Pt reports of no significant changes since last visit.  [de-identified] : 3/13/2023 [de-identified] : Dr. Vaughan [de-identified] :  Local 1

## 2024-01-31 NOTE — REASON FOR VISIT
[FreeTextEntry2] : WC FU - Rt. ankle DOI: 2/23/23 Patient returns the office with continued increasing pain in the right ankle area he also complains of some numbness in the foot from the dorsal aspect rating to the first 4 toes extremity the second second third fourth toes also has some tenderness on the medial side of the ankle.  Examination today reveals ambulates with a slight antalgic gait pain is increased somewhat on toe walking and less on heel walking he has localized tenderness between the first and second metatarsal heads consistent with a neuroma he has mild swelling on the medial side of the ankle with some tenderness in that region he has no gross ankle instability and neurovascular status is otherwise intact

## 2024-01-31 NOTE — PHYSICAL EXAM
[de-identified] : Patient returns for follow-up examination of the right ankle.  He still has some pain in the ankle on the lateral side.  He also describes some numbness in his right foot.  His MRI was performed since his prior previous visit and did show significant lateral ligamentous degenerative changes.  He also has some degenerative changes and scarring in the tarsal tunnel region.  He reports some vague decreased sensation on the medial aspect of aspect of his foot or the great toe.

## 2024-01-31 NOTE — ASSESSMENT
[FreeTextEntry1] : Hopefully the local cortisone injection will help alleviate some of his symptoms and numbness he will be scheduled for nerve conduction study to evaluate the numbness in his right foot and return after that is done at this point he is coming up on a year since the injury and he still having problems.

## 2024-02-09 ENCOUNTER — APPOINTMENT (OUTPATIENT)
Dept: ORTHOPEDIC SURGERY | Facility: CLINIC | Age: 59
End: 2024-02-09
Payer: OTHER MISCELLANEOUS

## 2024-02-09 VITALS — BODY MASS INDEX: 29.92 KG/M2 | HEIGHT: 70 IN | WEIGHT: 209 LBS

## 2024-02-09 PROCEDURE — 99213 OFFICE O/P EST LOW 20 MIN: CPT | Mod: 25

## 2024-02-09 PROCEDURE — 20605 DRAIN/INJ JOINT/BURSA W/O US: CPT | Mod: RT

## 2024-02-09 RX ORDER — DICLOFENAC POTASSIUM 50 MG/1
50 TABLET, COATED ORAL TWICE DAILY
Qty: 14 | Refills: 1 | Status: ACTIVE | COMMUNITY
Start: 2024-02-09 | End: 1900-01-01

## 2024-02-09 NOTE — HISTORY OF PRESENT ILLNESS
[Work related] : work related [Gradual] : gradual [7] : 7 [Sharp] : sharp [Intermittent] : intermittent [Household chores] : household chores [Leisure] : leisure [Rest] : rest [Standing] : standing [Walking] : walking [Stairs] : stairs [Full time] : Work status: full time [] : Post Surgical Visit: no [FreeTextEntry1] : R Ankle R Ankle [FreeTextEntry3] : 2/23/2023 [de-identified] : 1/31/2024 [de-identified] : Dr. Vaughan  [de-identified] : MRI [de-identified] :  [place] [employer] [place]

## 2024-02-09 NOTE — REASON FOR VISIT
[FreeTextEntry2] : Patient complains of continued pain R Ankle. Pain walking. / Patient returns to the office complaining of increasing pain in his right ankle over the last several days.  He denies any recent injury causing his flareup of pain examination today reveals ambulates with a moderate antalgic gait.  He has decreased pushoff on the right ankle he is locally tender on the dorsal lateral aspect of the ankle consistent with tendinitis in that region neurovascular status otherwise intact in the right foot and ankle region

## 2024-02-09 NOTE — ASSESSMENT
[FreeTextEntry1] : Hopefully the shot will help alleviate his acute flareup of symptoms.  He also given a prescription for Cataflam 50 mg twice a day he will return in a couple weeks and hopefully will have the nerve conduction study to rule out tarsal tunnel done before he returns.

## 2024-02-09 NOTE — PROCEDURE
[FreeTextEntry3] : Mixture of 1 cc of lidocaine and 1 cc of Depo-Medrol mixed together and injected into the area of acute tenderness on the anterolateral aspect of his ankle.  He felt some improvement after the injection was performed and he will return to work and follow-up in a couple weeks

## 2024-02-20 ENCOUNTER — RX RENEWAL (OUTPATIENT)
Age: 59
End: 2024-02-20

## 2024-02-20 RX ORDER — OLMESARTAN MEDOXOMIL 40 MG/1
40 TABLET, FILM COATED ORAL
Qty: 90 | Refills: 3 | Status: ACTIVE | COMMUNITY
Start: 2020-12-16 | End: 1900-01-01

## 2024-03-18 ENCOUNTER — APPOINTMENT (OUTPATIENT)
Dept: ORTHOPEDIC SURGERY | Facility: CLINIC | Age: 59
End: 2024-03-18
Payer: OTHER MISCELLANEOUS

## 2024-03-18 VITALS — HEIGHT: 70 IN | WEIGHT: 209 LBS | BODY MASS INDEX: 29.92 KG/M2

## 2024-03-18 PROCEDURE — 99213 OFFICE O/P EST LOW 20 MIN: CPT

## 2024-03-18 NOTE — REASON FOR VISIT
[FreeTextEntry2] : Patient complains of continued pain R Ankle. Has some improvement since last visit.  Patient returns to the office still complaining of pain in the right ankle and foot he also has some numbness in the second third and fourth toes he gets swelling when he is on his feet for prolonged period of time.  Nation today reveals ambulation with a slight antalgic gait he has some decrease sensation in the second third and fourth toes to touch examination he has increased pain over the ATF ligament and calcaneofibular ligament on inversion of the ankle he has minimal swelling today he has difficulty trying to walk on his toes and heels.

## 2024-03-18 NOTE — ASSESSMENT
[FreeTextEntry1] : At this point in time and is over a year since the accident and unlikely to change my much in his symptomology I recommended supportive shoes and anti-inflammatory medicine as necessary and possibly local cortisone shot as necessary for any flareups he will return on appearing basis and we will try and come up with a schedule loss exam for him

## 2024-03-18 NOTE — HISTORY OF PRESENT ILLNESS
[Work related] : work related [Gradual] : gradual [7] : 7 [Sharp] : sharp [Intermittent] : intermittent [Household chores] : household chores [Leisure] : leisure [Rest] : rest [Walking] : walking [Standing] : standing [Stairs] : stairs [Full time] : Work status: full time [] : Post Surgical Visit: no [FreeTextEntry1] : R Ankle R Ankle [FreeTextEntry3] : 2/23/2023 [de-identified] : 2/09/24 [de-identified] : Dr. Vaughan  [de-identified] : MRI [de-identified] :  [place] [place]

## 2024-04-10 ENCOUNTER — APPOINTMENT (OUTPATIENT)
Dept: ORTHOPEDIC SURGERY | Facility: CLINIC | Age: 59
End: 2024-04-10
Payer: OTHER MISCELLANEOUS

## 2024-04-10 VITALS — HEIGHT: 70 IN | BODY MASS INDEX: 29.92 KG/M2 | WEIGHT: 209 LBS

## 2024-04-10 DIAGNOSIS — R20.0 ANESTHESIA OF SKIN: ICD-10-CM

## 2024-04-10 DIAGNOSIS — G57.51 TARSAL TUNNEL SYNDROME, RIGHT LOWER LIMB: ICD-10-CM

## 2024-04-10 DIAGNOSIS — S93.411A SPRAIN OF CALCANEOFIBULAR LIGAMENT OF RIGHT ANKLE, INITIAL ENCOUNTER: ICD-10-CM

## 2024-04-10 PROCEDURE — 99213 OFFICE O/P EST LOW 20 MIN: CPT

## 2024-05-15 NOTE — ASSESSMENT
[FreeTextEntry1] : Patient will use the anti-inflammatory as necessary and try and wear comfortable shoes at this point time is over a year since the injury it is unlikely see any significant change in his overall symptoms while the nerve conduction study would be helpful to confirm his diagnosis and evaluate the neurologic symptoms he is having if that is not done we will come up with a percentage of loss based on her current exam and MRI At this point he is at his maximum level of improvement and would determine a 20-25% schedule of loss for this injury.

## 2024-05-15 NOTE — REVIEW OF SYSTEMS
[Joint Swelling] : joint swelling [Joint Pain] : joint pain [Negative] : Heme/Lymph [FreeTextEntry2] : numbness

## 2024-05-15 NOTE — HISTORY OF PRESENT ILLNESS
[Work related] : work related [7] : 7 [Throbbing] : throbbing [Full time] : Work status: full time [FreeTextEntry1] : right ankle [de-identified] : none

## 2024-05-15 NOTE — REASON FOR VISIT
[FreeTextEntry2] : [facility] 2/23/23 Right ankle/foot pain Patient returns in follow-up of his right ankle and foot it seems as though his nerve conduction study was denied by [employer]er's Comp. and has not been able to get that done he still complains of taint pain on the lateral side of his right ankle and foot region he is able to walk fairly well with mild discomfort he can take a few steps on his toes and heels he has no significant significant swelling but his main complaint is the numbness on the side of his foot

## 2024-05-21 ENCOUNTER — APPOINTMENT (OUTPATIENT)
Dept: ORTHOPEDIC SURGERY | Facility: CLINIC | Age: 59
End: 2024-05-21

## 2025-02-03 ENCOUNTER — APPOINTMENT (OUTPATIENT)
Dept: ORTHOPEDIC SURGERY | Facility: CLINIC | Age: 60
End: 2025-02-03

## 2025-02-03 VITALS — BODY MASS INDEX: 31.21 KG/M2 | WEIGHT: 218 LBS | HEIGHT: 70 IN

## 2025-02-03 DIAGNOSIS — M19.011 PRIMARY OSTEOARTHRITIS, RIGHT SHOULDER: ICD-10-CM

## 2025-02-03 DIAGNOSIS — S76.312A STRAIN OF MUSCLE, FASCIA AND TENDON OF THE POSTERIOR MUSCLE GROUP AT THIGH LEVEL, LEFT THIGH, INITIAL ENCOUNTER: ICD-10-CM

## 2025-02-03 DIAGNOSIS — M23.92 UNSPECIFIED INTERNAL DERANGEMENT OF LEFT KNEE: ICD-10-CM

## 2025-02-03 PROCEDURE — 72100 X-RAY EXAM L-S SPINE 2/3 VWS: CPT

## 2025-02-03 PROCEDURE — 73030 X-RAY EXAM OF SHOULDER: CPT | Mod: RT

## 2025-02-03 PROCEDURE — 72170 X-RAY EXAM OF PELVIS: CPT

## 2025-02-03 PROCEDURE — 99204 OFFICE O/P NEW MOD 45 MIN: CPT

## 2025-02-03 RX ORDER — METHYLPREDNISOLONE 4 MG/1
4 TABLET ORAL
Qty: 1 | Refills: 1 | Status: ACTIVE | COMMUNITY
Start: 2025-02-03 | End: 1900-01-01

## 2025-02-08 ENCOUNTER — APPOINTMENT (OUTPATIENT)
Dept: MRI IMAGING | Facility: CLINIC | Age: 60
End: 2025-02-08
Payer: OTHER MISCELLANEOUS

## 2025-02-08 PROCEDURE — 73718 MRI LOWER EXTREMITY W/O DYE: CPT | Mod: LT

## 2025-02-08 PROCEDURE — 73721 MRI JNT OF LWR EXTRE W/O DYE: CPT | Mod: LT

## 2025-02-12 ENCOUNTER — RX RENEWAL (OUTPATIENT)
Age: 60
End: 2025-02-12

## 2025-02-18 ENCOUNTER — APPOINTMENT (OUTPATIENT)
Dept: ORTHOPEDIC SURGERY | Facility: CLINIC | Age: 60
End: 2025-02-18
Payer: OTHER MISCELLANEOUS

## 2025-02-18 VITALS — BODY MASS INDEX: 31.21 KG/M2 | HEIGHT: 70 IN | WEIGHT: 218 LBS

## 2025-02-18 DIAGNOSIS — M23.92 UNSPECIFIED INTERNAL DERANGEMENT OF LEFT KNEE: ICD-10-CM

## 2025-02-18 DIAGNOSIS — S76.312A STRAIN OF MUSCLE, FASCIA AND TENDON OF THE POSTERIOR MUSCLE GROUP AT THIGH LEVEL, LEFT THIGH, INITIAL ENCOUNTER: ICD-10-CM

## 2025-02-18 DIAGNOSIS — M17.12 UNILATERAL PRIMARY OSTEOARTHRITIS, LEFT KNEE: ICD-10-CM

## 2025-02-18 PROCEDURE — 99203 OFFICE O/P NEW LOW 30 MIN: CPT

## 2025-02-19 ENCOUNTER — APPOINTMENT (OUTPATIENT)
Dept: OTOLARYNGOLOGY | Facility: CLINIC | Age: 60
End: 2025-02-19
Payer: COMMERCIAL

## 2025-02-19 ENCOUNTER — NON-APPOINTMENT (OUTPATIENT)
Age: 60
End: 2025-02-19

## 2025-02-19 ENCOUNTER — TRANSCRIPTION ENCOUNTER (OUTPATIENT)
Age: 60
End: 2025-02-19

## 2025-02-19 VITALS
SYSTOLIC BLOOD PRESSURE: 125 MMHG | HEART RATE: 66 BPM | DIASTOLIC BLOOD PRESSURE: 73 MMHG | BODY MASS INDEX: 31.21 KG/M2 | HEIGHT: 70 IN | WEIGHT: 218 LBS

## 2025-02-19 DIAGNOSIS — K11.21 ACUTE SIALOADENITIS: ICD-10-CM

## 2025-02-19 DIAGNOSIS — B96.89 ACUTE SIALOADENITIS: ICD-10-CM

## 2025-02-19 DIAGNOSIS — Z83.3 FAMILY HISTORY OF DIABETES MELLITUS: ICD-10-CM

## 2025-02-19 PROCEDURE — 99204 OFFICE O/P NEW MOD 45 MIN: CPT

## 2025-02-19 RX ORDER — METHYLPREDNISOLONE 4 MG/1
4 TABLET ORAL
Qty: 1 | Refills: 0 | Status: ACTIVE | COMMUNITY
Start: 2025-02-19 | End: 1900-01-01

## 2025-03-13 ENCOUNTER — APPOINTMENT (OUTPATIENT)
Dept: OTOLARYNGOLOGY | Facility: CLINIC | Age: 60
End: 2025-03-13
Payer: COMMERCIAL

## 2025-03-13 VITALS
HEART RATE: 70 BPM | HEIGHT: 70 IN | SYSTOLIC BLOOD PRESSURE: 137 MMHG | WEIGHT: 214 LBS | DIASTOLIC BLOOD PRESSURE: 73 MMHG | BODY MASS INDEX: 30.64 KG/M2

## 2025-03-13 DIAGNOSIS — K11.21 ACUTE SIALOADENITIS: ICD-10-CM

## 2025-03-13 DIAGNOSIS — H90.3 SENSORINEURAL HEARING LOSS, BILATERAL: ICD-10-CM

## 2025-03-13 DIAGNOSIS — B96.89 ACUTE SIALOADENITIS: ICD-10-CM

## 2025-03-13 PROCEDURE — 92557 COMPREHENSIVE HEARING TEST: CPT

## 2025-03-13 PROCEDURE — 92567 TYMPANOMETRY: CPT

## 2025-03-13 PROCEDURE — 99213 OFFICE O/P EST LOW 20 MIN: CPT

## 2025-04-14 ENCOUNTER — APPOINTMENT (OUTPATIENT)
Dept: OTOLARYNGOLOGY | Facility: CLINIC | Age: 60
End: 2025-04-14
Payer: COMMERCIAL

## 2025-04-14 VITALS
BODY MASS INDEX: 31.39 KG/M2 | HEART RATE: 64 BPM | DIASTOLIC BLOOD PRESSURE: 74 MMHG | HEIGHT: 70 IN | WEIGHT: 219.25 LBS | SYSTOLIC BLOOD PRESSURE: 136 MMHG

## 2025-04-14 DIAGNOSIS — H90.3 SENSORINEURAL HEARING LOSS, BILATERAL: ICD-10-CM

## 2025-04-14 DIAGNOSIS — R06.83 SNORING: ICD-10-CM

## 2025-04-14 DIAGNOSIS — K13.21 LEUKOPLAKIA OF ORAL MUCOSA, INCLUDING TONGUE: ICD-10-CM

## 2025-04-14 DIAGNOSIS — K21.9 GASTRO-ESOPHAGEAL REFLUX DISEASE W/OUT ESOPHAGITIS: ICD-10-CM

## 2025-04-14 DIAGNOSIS — R07.0 PAIN IN THROAT: ICD-10-CM

## 2025-04-14 DIAGNOSIS — J38.4 EDEMA OF LARYNX: ICD-10-CM

## 2025-04-14 DIAGNOSIS — H92.02 OTALGIA, LEFT EAR: ICD-10-CM

## 2025-04-14 DIAGNOSIS — J31.0 CHRONIC RHINITIS: ICD-10-CM

## 2025-04-14 DIAGNOSIS — J34.2 DEVIATED NASAL SEPTUM: ICD-10-CM

## 2025-04-14 PROCEDURE — 92567 TYMPANOMETRY: CPT

## 2025-04-14 PROCEDURE — 92557 COMPREHENSIVE HEARING TEST: CPT

## 2025-04-14 PROCEDURE — 31575 DIAGNOSTIC LARYNGOSCOPY: CPT

## 2025-04-14 PROCEDURE — 99214 OFFICE O/P EST MOD 30 MIN: CPT | Mod: 25

## 2025-04-14 RX ORDER — METHYLPREDNISOLONE 4 MG/1
4 TABLET ORAL
Qty: 1 | Refills: 0 | Status: ACTIVE | COMMUNITY
Start: 2025-04-14 | End: 1900-01-01

## 2025-04-14 RX ORDER — AMOXICILLIN AND CLAVULANATE POTASSIUM 875; 125 MG/1; MG/1
875-125 TABLET, COATED ORAL
Qty: 20 | Refills: 1 | Status: ACTIVE | COMMUNITY
Start: 2025-04-14 | End: 1900-01-01

## 2025-04-28 ENCOUNTER — APPOINTMENT (OUTPATIENT)
Dept: ORTHOPEDIC SURGERY | Facility: CLINIC | Age: 60
End: 2025-04-28
Payer: OTHER MISCELLANEOUS

## 2025-04-28 VITALS — WEIGHT: 214 LBS | BODY MASS INDEX: 30.64 KG/M2 | HEIGHT: 70 IN

## 2025-04-28 DIAGNOSIS — M17.12 UNILATERAL PRIMARY OSTEOARTHRITIS, LEFT KNEE: ICD-10-CM

## 2025-04-28 DIAGNOSIS — S76.312A STRAIN OF MUSCLE, FASCIA AND TENDON OF THE POSTERIOR MUSCLE GROUP AT THIGH LEVEL, LEFT THIGH, INITIAL ENCOUNTER: ICD-10-CM

## 2025-04-28 DIAGNOSIS — M23.92 UNSPECIFIED INTERNAL DERANGEMENT OF LEFT KNEE: ICD-10-CM

## 2025-04-28 PROCEDURE — 99213 OFFICE O/P EST LOW 20 MIN: CPT
